# Patient Record
Sex: MALE | Race: WHITE | NOT HISPANIC OR LATINO
[De-identification: names, ages, dates, MRNs, and addresses within clinical notes are randomized per-mention and may not be internally consistent; named-entity substitution may affect disease eponyms.]

---

## 2022-02-01 ENCOUNTER — DASHBOARD ENCOUNTERS (OUTPATIENT)
Age: 57
End: 2022-02-01

## 2022-02-01 ENCOUNTER — WEB ENCOUNTER (OUTPATIENT)
Dept: URBAN - NONMETROPOLITAN AREA CLINIC 13 | Facility: CLINIC | Age: 57
End: 2022-02-01

## 2022-02-01 ENCOUNTER — OFFICE VISIT (OUTPATIENT)
Dept: URBAN - NONMETROPOLITAN AREA CLINIC 13 | Facility: CLINIC | Age: 57
End: 2022-02-01
Payer: COMMERCIAL

## 2022-02-01 DIAGNOSIS — Z80.0 FAMILY HISTORY OF COLON CANCER: ICD-10-CM

## 2022-02-01 DIAGNOSIS — K21.9 GERD WITHOUT ESOPHAGITIS: ICD-10-CM

## 2022-02-01 PROCEDURE — 99244 OFF/OP CNSLTJ NEW/EST MOD 40: CPT | Performed by: INTERNAL MEDICINE

## 2022-02-01 PROCEDURE — 99204 OFFICE O/P NEW MOD 45 MIN: CPT | Performed by: INTERNAL MEDICINE

## 2022-02-01 NOTE — HPI-TODAY'S VISIT:
Ac is a new patient referred by Dr. Andrea Carlos for colon cancer screening and GERD.  A copy of records will be sent to his office.  His father  of colon cancer in 2017.  He has had a few colonoscopies with history of polyps.  He denies any lower GI issues.  He has had a long history of reflux.  He has nighttime reflux.  He takes intermittent PPI in the evening.  He does try to avoid food at night.  He denies any dysphagia.  He has had an endoscopy in the past but cannot remember the results.  He denies any dysphagia

## 2022-04-26 ENCOUNTER — OFFICE VISIT (OUTPATIENT)
Dept: URBAN - NONMETROPOLITAN AREA SURGERY CENTER 1 | Facility: SURGERY CENTER | Age: 57
End: 2022-04-26
Payer: COMMERCIAL

## 2022-04-26 DIAGNOSIS — K21.9 ACID REFLUX: ICD-10-CM

## 2022-04-26 DIAGNOSIS — Z12.11 COLON CANCER SCREENING: ICD-10-CM

## 2022-04-26 DIAGNOSIS — Z80.0 BROTHER AT YOUNG AGE FAMILY HISTORY OF COLON CANCER: ICD-10-CM

## 2022-04-26 PROCEDURE — G8907 PT DOC NO EVENTS ON DISCHARG: HCPCS | Performed by: INTERNAL MEDICINE

## 2022-04-26 PROCEDURE — 43235 EGD DIAGNOSTIC BRUSH WASH: CPT | Performed by: INTERNAL MEDICINE

## 2022-04-26 PROCEDURE — G0105 COLORECTAL SCRN; HI RISK IND: HCPCS | Performed by: INTERNAL MEDICINE

## 2022-05-24 ENCOUNTER — OFFICE VISIT (OUTPATIENT)
Dept: URBAN - NONMETROPOLITAN AREA CLINIC 13 | Facility: CLINIC | Age: 57
End: 2022-05-24

## 2022-05-24 PROBLEM — 266435005: Status: ACTIVE | Noted: 2022-02-01

## 2022-05-24 NOTE — HPI-TODAY'S VISIT:
2022 Ac is a new patient referred by Dr. Andrea Carlos for colon cancer screening and GERD.  A copy of records will be sent to his office.  His father  of colon cancer in 2017.  He has had a few colonoscopies with history of polyps.  He denies any lower GI issues.  He has had a long history of reflux.  He has nighttime reflux.  He takes intermittent PPI in the evening.  He does try to avoid food at night.  He denies any dysphagia.  He has had an endoscopy in the past but cannot remember the results.  He denies any dysphagia

## 2022-05-24 NOTE — HPI-OTHER HISTORIES
2022 Ac is a new patient referred by Dr. Andrea Carlos for colon cancer screening and GERD.  A copy of records will be sent to his office.  His father  of colon cancer in 2017.  He has had a few colonoscopies with history of polyps.  He denies any lower GI issues.  He has had a long history of reflux.  He has nighttime reflux.  He takes intermittent PPI in the evening.  He does try to avoid food at night.  He denies any dysphagia.  He has had an endoscopy in the past but cannot remember the results.  He denies any dysphagia  2022 EGD: 3cm hiata hernia Colon: Normal